# Patient Record
Sex: FEMALE | Race: BLACK OR AFRICAN AMERICAN | URBAN - METROPOLITAN AREA
[De-identification: names, ages, dates, MRNs, and addresses within clinical notes are randomized per-mention and may not be internally consistent; named-entity substitution may affect disease eponyms.]

---

## 2019-03-13 ENCOUNTER — TELEPHONE (OUTPATIENT)
Dept: INTERNAL MEDICINE CLINIC | Age: 29
End: 2019-03-13

## 2019-03-13 NOTE — TELEPHONE ENCOUNTER
Pt came in for new pt visit with Aysha Alvarado at 1059am appt 11am, I advised pt we would have to r/s as new pts need to arrive 30 mins easily she said she was told 15mins early I tolod her I would see who I could get her in with closer to the [place] area and she became rude and said I cant be seen today? I advised her I was trying to r/s her but she stood up and walked away!       She then called our office at 1120 am I answered the phone stating my name and she said Mmmmmm and hung up on me and then called back and spoke with [facility]

## 2019-03-13 NOTE — TELEPHONE ENCOUNTER
Patient called back and asked to speak with the manager. I put her on hold to get the manager. She was not available so when I came back to the phone I said, \"Ms. Dary Mckay she is in a meeting right now. Can I please have your number so I can have her call you back? \" She said, \"well, you have my name and I didn't give it to you so you have my number too. I don't need to give it to you\" and she hung up the phone.